# Patient Record
Sex: MALE | Race: WHITE | Employment: FULL TIME | URBAN - METROPOLITAN AREA
[De-identification: names, ages, dates, MRNs, and addresses within clinical notes are randomized per-mention and may not be internally consistent; named-entity substitution may affect disease eponyms.]

---

## 2023-05-25 ENCOUNTER — HOSPITAL ENCOUNTER (EMERGENCY)
Facility: CLINIC | Age: 33
Discharge: LEFT BEFORE DISCHARGE | End: 2023-05-25
Attending: EMERGENCY MEDICINE
Payer: COMMERCIAL

## 2023-05-25 VITALS
SYSTOLIC BLOOD PRESSURE: 135 MMHG | HEIGHT: 75 IN | DIASTOLIC BLOOD PRESSURE: 91 MMHG | OXYGEN SATURATION: 98 % | TEMPERATURE: 98 F | BODY MASS INDEX: 23 KG/M2 | RESPIRATION RATE: 18 BRPM | HEART RATE: 72 BPM | WEIGHT: 185 LBS

## 2023-05-25 DIAGNOSIS — R10.30 LOWER ABDOMINAL PAIN: Primary | ICD-10-CM

## 2023-05-25 LAB
BILIRUB UR QL STRIP: NEGATIVE
CLARITY UR: CLEAR
COLOR UR: YELLOW
COMMENT UA: NORMAL
GLUCOSE UR STRIP-MCNC: NEGATIVE MG/DL
HGB UR QL STRIP.AUTO: NEGATIVE
KETONES UR STRIP-MCNC: NEGATIVE MG/DL
LEUKOCYTE ESTERASE UR QL STRIP: NEGATIVE
NITRITE UR QL STRIP: NEGATIVE
PH UR STRIP: 6 [PH] (ref 5–8)
PROT UR STRIP-MCNC: NEGATIVE MG/DL
SP GR UR STRIP: 1.02 (ref 1–1.03)
UROBILINOGEN UR STRIP-ACNC: NORMAL

## 2023-05-25 PROCEDURE — 99283 EMERGENCY DEPT VISIT LOW MDM: CPT

## 2023-05-25 PROCEDURE — 81003 URINALYSIS AUTO W/O SCOPE: CPT

## 2023-05-25 ASSESSMENT — PAIN DESCRIPTION - DESCRIPTORS: DESCRIPTORS: CRAMPING

## 2023-05-25 ASSESSMENT — PAIN DESCRIPTION - LOCATION: LOCATION: ABDOMEN

## 2023-05-25 ASSESSMENT — PAIN - FUNCTIONAL ASSESSMENT: PAIN_FUNCTIONAL_ASSESSMENT: 0-10

## 2023-05-25 ASSESSMENT — PAIN SCALES - GENERAL: PAINLEVEL_OUTOF10: 6

## 2023-05-26 NOTE — ED NOTES
Lab at bedside to draw labs. Lab draw unsuccessful x 1. Pt. Refusing to be poked again. Pt. Refusing blood work, but will give urine specimen. Dr. Soni Garza at bedside.      Kamaljit Olivo RN  05/25/23 2948

## 2023-05-26 NOTE — ED PROVIDER NOTES
[facility]  [address] [facility]  Phone: [phone]      Pt Name: Nikko Coelho  [id number]  [place] 1990  Date of evaluation: 5/25/2023      CHIEF COMPLAINT       Chief Complaint   Patient presents with    Abdominal Pain     Bilateral worse to left started one hour ago denies N/V/D       HISTORY OF PRESENT ILLNESS   Nikko Coelho is a 28 y.o. male with history of seasonal allergies on Claritin who presents for evaluation of abdominal pain. The patient reports that starting approximately 1 hour ago he developed a dull, achy, nonradiating pain to each side of his lower abdomen that has been constant but waxing and waning. He has not taken any medications for symptoms and does not list any provoking or palliating factors. He denies any previous history of his current symptoms and denies any abdominal trauma. The patient has never had any abdominal surgery. He states that he ate pizza and chili for dinner. He denies any sick contacts and other people ate the same meal and did not get sick. The patient denies fever, chills, headache, vision changes, neck pain, back pain, chest pain, shortness of breath, nausea, vomiting, urinary/bowel symptoms, focal weakness, numbness, tingling, recent injury or illness. REVIEW OF SYSTEMS     Positive: Abdominal pain  Ten point review of systems was reviewed and is negative unless otherwise noted in the HPI    [address]    has a past medical history of ADHD and Seasonal allergies. SURGICAL HISTORY      has no past surgical history on file. The patient denies    CURRENT MEDICATIONS       Discharge Medication List as of 5/25/2023 11:21 PM        CONTINUE these medications which have NOT CHANGED    Details   loratadine-pseudoephedrine (CLARITIN-D 24HR)  MG per extended release tablet Take 1 tablet by mouth dailyHistorical Med             ALLERGIES     has No Known Allergies.     FAMILY HISTORY     has no family status information on [Feeling Poorly] : feeling poorly [Hearing Loss] : hearing loss [Negative] : Genitourinary [Fever] : no fever [Chills] : no chills [Feeling Tired] : not feeling tired [Eye Pain] : no eye pain [Eyes Itch] : no itching of the eyes [Red  Eyes] : eyes not red [Dry Eyes] : no dryness of the eyes [Ear Pain] : no ear pain [Throat Pain] : no throat pain [Nose Bleeds] : no nose bleeds [FreeTextEntry3] : + hx of congenital cataracts

## 2023-05-26 NOTE — ED NOTES
Pt. Ambulatory to room # 12 from waiting area, gait steady Pt. C/o bilateral lower abdominal pain for the last hour that is a little better now. Pt.  Denies N/V/D. Pt. Alert and oriented x4. RR equal and non labored. NaD noted. Call light within reach.      Christi Garay RN  05/25/23 0219